# Patient Record
Sex: FEMALE | Race: WHITE | NOT HISPANIC OR LATINO | Employment: FULL TIME | URBAN - METROPOLITAN AREA
[De-identification: names, ages, dates, MRNs, and addresses within clinical notes are randomized per-mention and may not be internally consistent; named-entity substitution may affect disease eponyms.]

---

## 2018-02-28 ENCOUNTER — OFFICE VISIT (OUTPATIENT)
Dept: URGENT CARE | Facility: CLINIC | Age: 48
End: 2018-02-28
Payer: COMMERCIAL

## 2018-02-28 ENCOUNTER — APPOINTMENT (OUTPATIENT)
Dept: RADIOLOGY | Facility: CLINIC | Age: 48
End: 2018-02-28
Attending: FAMILY MEDICINE
Payer: COMMERCIAL

## 2018-02-28 VITALS
TEMPERATURE: 97.5 F | WEIGHT: 135 LBS | DIASTOLIC BLOOD PRESSURE: 72 MMHG | BODY MASS INDEX: 22.49 KG/M2 | HEART RATE: 73 BPM | OXYGEN SATURATION: 97 % | SYSTOLIC BLOOD PRESSURE: 118 MMHG | HEIGHT: 65 IN | RESPIRATION RATE: 16 BRPM

## 2018-02-28 DIAGNOSIS — R05.9 COUGH: Primary | ICD-10-CM

## 2018-02-28 DIAGNOSIS — S20.212A RIB CONTUSION, LEFT, INITIAL ENCOUNTER: ICD-10-CM

## 2018-02-28 PROCEDURE — 71101 X-RAY EXAM UNILAT RIBS/CHEST: CPT

## 2018-02-28 PROCEDURE — 99203 OFFICE O/P NEW LOW 30 MIN: CPT | Performed by: FAMILY MEDICINE

## 2018-02-28 RX ORDER — BENZONATATE 200 MG/1
200 CAPSULE ORAL 3 TIMES DAILY PRN
Qty: 20 CAPSULE | Refills: 0 | Status: SHIPPED | OUTPATIENT
Start: 2018-02-28

## 2018-02-28 NOTE — PROGRESS NOTES
Assessment/Plan:  Patient Instructions   Cough which is likely related to allergies/post-nasal drip  Xray shows no acute abnormalities  I have prescribed tessalon pearls to help w/ the cough and have instructed the patient to start an anti-allergy medication such as Claritin or Zyrtec  I have advised drinking plenty of fluids and avoiding respiratory allergens  Follow up w/ PCP for re-check in 1 week  No problem-specific Assessment & Plan notes found for this encounter  Diagnoses and all orders for this visit:    Cough  -     X-ray ribs left with PA chest  -     benzonatate (TESSALON) 200 MG capsule; Take 1 capsule (200 mg total) by mouth 3 (three) times a day as needed for cough          Subjective:      Patient ID: Lauren Schwartz is a 41 y o  female  40 yo female presents c/o dry cough x 4 weeks  No fever/chills  No other URI sx, but does feel a PND  No chest pain, SOB, or wheezing  Non-smoker  No hx of asthma or COPD  Has been taking Mucinex as needed  No recent travel or known sick contacts  Also states she fell and hurt her left lateral ribs a few weeks ago, had some bruising at the time which is now resolved  Has pain in her left ribs w/ coughing, sneezing, laughing  No treatment attempted  The following portions of the patient's history were reviewed and updated as appropriate: allergies, current medications, past family history, past medical history, past social history, past surgical history and problem list     Review of Systems   Constitutional: Negative  HENT: Positive for postnasal drip  Eyes: Negative  Respiratory: Positive for cough  Negative for chest tightness, shortness of breath and wheezing  Cardiovascular: Negative  Gastrointestinal: Negative  Musculoskeletal:        As noted in HPI   Skin: Negative  Neurological: Negative  Hematological: Negative  Psychiatric/Behavioral: Negative            Objective:      /72   Pulse 73   Temp 97 5 °F (36 4 °C)   Resp 16   Ht 5' 5" (1 651 m)   Wt 61 2 kg (135 lb)   SpO2 97%   Breastfeeding? No   BMI 22 47 kg/m²          Physical Exam   Constitutional: She is oriented to person, place, and time  Vital signs are normal  She appears well-developed and well-nourished  She is active and cooperative  No distress  HENT:   Head: Normocephalic and atraumatic  Right Ear: Hearing, tympanic membrane, external ear and ear canal normal    Left Ear: Hearing, tympanic membrane, external ear and ear canal normal    Nose: Nose normal    Mouth/Throat: Oropharynx is clear and moist and mucous membranes are normal  No oropharyngeal exudate  Eyes: Conjunctivae and EOM are normal  Pupils are equal, round, and reactive to light  Neck: Normal range of motion  Neck supple  Cardiovascular: Normal rate, regular rhythm and normal heart sounds  Pulmonary/Chest: Effort normal and breath sounds normal  No respiratory distress  She has no wheezes  She has no rales  Musculoskeletal:   No swelling, redness, or bruising  Mild left lateral rib tenderness  No step offs palpated  No spinal or paraspinal tenderness  Lymphadenopathy:     She has no cervical adenopathy  Neurological: She is alert and oriented to person, place, and time  Skin: She is not diaphoretic  Psychiatric: She has a normal mood and affect  Her speech is normal and behavior is normal  Judgment and thought content normal  Cognition and memory are normal    Nursing note and vitals reviewed

## 2018-02-28 NOTE — PATIENT INSTRUCTIONS
Cough which is likely related to allergies/post-nasal drip  Xray shows no acute abnormalities  I have prescribed tessalon pearls to help w/ the cough and have instructed the patient to start an anti-allergy medication such as Claritin or Zyrtec  I have advised drinking plenty of fluids and avoiding respiratory allergens  Follow up w/ PCP for re-check in 1 week